# Patient Record
Sex: FEMALE | Race: WHITE | URBAN - NONMETROPOLITAN AREA
[De-identification: names, ages, dates, MRNs, and addresses within clinical notes are randomized per-mention and may not be internally consistent; named-entity substitution may affect disease eponyms.]

---

## 2017-02-28 ENCOUNTER — DOCTOR'S OFFICE (OUTPATIENT)
Dept: URBAN - NONMETROPOLITAN AREA CLINIC 1 | Facility: CLINIC | Age: 52
Setting detail: OPHTHALMOLOGY
End: 2017-02-28
Payer: COMMERCIAL

## 2017-02-28 DIAGNOSIS — Z01.00: ICD-10-CM

## 2017-02-28 DIAGNOSIS — H52.4: ICD-10-CM

## 2017-02-28 PROCEDURE — 92310 CONTACT LENS FITTING OU: CPT | Performed by: OPTOMETRIST

## 2017-02-28 PROCEDURE — 92014 COMPRE OPH EXAM EST PT 1/>: CPT | Performed by: OPTOMETRIST

## 2017-02-28 ASSESSMENT — REFRACTION_AUTOREFRACTION
OS_AXIS: 147
OD_CYLINDER: -0.25
OS_CYLINDER: -0.25
OS_SPHERE: -3.75
OD_AXIS: 035
OD_SPHERE: -2.75

## 2017-02-28 ASSESSMENT — REFRACTION_MANIFEST
OD_VA3: 20/
OD_VA2: 20/
OU_VA: 20/
OD_VA2: 20/
OS_VA1: 20/
OS_VA2: 20/
OU_VA: 20/
OS_VA2: 20/
OS_VA1: 20/
OD_VA1: 20/
OD_VA1: 20/
OS_VA3: 20/
OD_VA3: 20/
OS_VA3: 20/

## 2017-02-28 ASSESSMENT — REFRACTION_OUTSIDERX
OS_ADD: +2.50
OS_VA2: 20/20-2
OS_VA3: 20/
OD_VA2: 20/20-2
OS_SPHERE: -3.50
OD_AXIS: 175
OD_VA1: 20/25
OD_SPHERE: -3.00
OS_VA1: 20/25
OS_CYLINDER: SPH
OD_ADD: +2.50
OD_CYLINDER: -0.25
OD_VA3: 20/
OU_VA: 20/

## 2017-02-28 ASSESSMENT — REFRACTION_CURRENTRX
OD_ADD: +2.25
OD_AXIS: 169
OS_OVR_VA: 20/
OS_OVR_VA: 20/
OD_VPRISM_DIRECTION: PROGS
OD_CYLINDER: -0.25
OS_OVR_VA: 20/
OD_OVR_VA: 20/
OS_SPHERE: -3.25
OD_OVR_VA: 20/
OS_VPRISM_DIRECTION: PROGS
OD_OVR_VA: 20/
OD_SPHERE: -3.25
OS_AXIS: 133
OS_ADD: +2.25
OS_CYLINDER: -0.25

## 2017-02-28 ASSESSMENT — VISUAL ACUITY
OS_BCVA: 20/15-1
OD_BCVA: 20/20-2

## 2017-02-28 ASSESSMENT — TEAR BREAK UP TIME (TBUT)
OS_TBUT: T 5 SEC
OD_TBUT: T 5 SEC

## 2017-02-28 ASSESSMENT — CONFRONTATIONAL VISUAL FIELD TEST (CVF)
OS_FINDINGS: FULL
OD_FINDINGS: FULL

## 2017-02-28 ASSESSMENT — SPHEQUIV_DERIVED
OD_SPHEQUIV: -2.875
OS_SPHEQUIV: -3.875

## 2017-03-29 ENCOUNTER — OPTICAL OFFICE (OUTPATIENT)
Dept: URBAN - NONMETROPOLITAN AREA CLINIC 4 | Facility: CLINIC | Age: 52
Setting detail: OPHTHALMOLOGY
End: 2017-03-29
Payer: MEDICARE

## 2017-03-29 DIAGNOSIS — H52.13: ICD-10-CM

## 2017-03-29 PROCEDURE — S0500 DISPOS CONT LENS: HCPCS | Performed by: OPTOMETRIST

## 2017-11-03 ENCOUNTER — OPTICAL OFFICE (OUTPATIENT)
Dept: URBAN - NONMETROPOLITAN AREA CLINIC 4 | Facility: CLINIC | Age: 52
Setting detail: OPHTHALMOLOGY
End: 2017-11-03

## 2017-11-03 DIAGNOSIS — H52.13: ICD-10-CM

## 2017-11-03 PROCEDURE — S0500 DISPOS CONT LENS: HCPCS | Performed by: OPTOMETRIST

## 2018-02-28 ENCOUNTER — DOCTOR'S OFFICE (OUTPATIENT)
Dept: URBAN - NONMETROPOLITAN AREA CLINIC 1 | Facility: CLINIC | Age: 53
Setting detail: OPHTHALMOLOGY
End: 2018-02-28
Payer: COMMERCIAL

## 2018-02-28 DIAGNOSIS — H52.4: ICD-10-CM

## 2018-02-28 DIAGNOSIS — Z01.00: ICD-10-CM

## 2018-02-28 PROCEDURE — 92310 CONTACT LENS FITTING OU: CPT | Performed by: OPTOMETRIST

## 2018-02-28 PROCEDURE — 92014 COMPRE OPH EXAM EST PT 1/>: CPT | Performed by: OPTOMETRIST

## 2018-02-28 ASSESSMENT — REFRACTION_OUTSIDERX
OS_ADD: +2.50
OD_SPHERE: -3.00
OS_CYLINDER: SPH
OD_AXIS: 175
OS_VA3: 20/
OD_VA3: 20/
OU_VA: 20/
OS_SPHERE: -3.50
OD_ADD: +2.50
OD_VA1: 20/20
OS_VA2: 20/20-2
OD_CYLINDER: -0.25
OD_VA2: 20/20-2
OS_VA1: 20/20

## 2018-02-28 ASSESSMENT — VISUAL ACUITY
OD_BCVA: 20/20
OS_BCVA: 20/15

## 2018-02-28 ASSESSMENT — REFRACTION_MANIFEST
OS_VA2: 20/
OS_VA1: 20/
OD_VA3: 20/
OD_VA2: 20/
OD_VA1: 20/
OD_VA2: 20/
OU_VA: 20/
OS_VA2: 20/
OD_VA3: 20/
OS_VA3: 20/
OD_VA1: 20/
OU_VA: 20/
OS_VA3: 20/
OS_VA1: 20/

## 2018-02-28 ASSESSMENT — CONFRONTATIONAL VISUAL FIELD TEST (CVF)
OD_FINDINGS: FULL
OS_FINDINGS: FULL

## 2018-02-28 ASSESSMENT — REFRACTION_AUTOREFRACTION
OS_AXIS: 145
OS_SPHERE: -3.25
OD_AXIS: 169
OS_CYLINDER: -1.75
OD_CYLINDER: -0.75
OD_SPHERE: -2.75

## 2018-02-28 ASSESSMENT — SPHEQUIV_DERIVED
OD_SPHEQUIV: -3.125
OS_SPHEQUIV: -4.125

## 2018-02-28 ASSESSMENT — REFRACTION_CURRENTRX
OS_VPRISM_DIRECTION: PROGS
OD_VPRISM_DIRECTION: PROGS
OD_ADD: +2.25
OS_OVR_VA: 20/
OD_SPHERE: -3.25
OS_CYLINDER: -0.25
OD_CYLINDER: -0.25
OD_AXIS: 169
OD_OVR_VA: 20/
OS_OVR_VA: 20/
OS_SPHERE: -3.25
OS_OVR_VA: 20/
OS_ADD: +2.25
OS_AXIS: 133
OD_OVR_VA: 20/
OD_OVR_VA: 20/

## 2018-02-28 ASSESSMENT — TEAR BREAK UP TIME (TBUT)
OS_TBUT: T 5 SEC
OD_TBUT: T 5 SEC

## 2018-08-03 ENCOUNTER — OPTICAL OFFICE (OUTPATIENT)
Dept: URBAN - NONMETROPOLITAN AREA CLINIC 4 | Facility: CLINIC | Age: 53
Setting detail: OPHTHALMOLOGY
End: 2018-08-03

## 2018-08-03 DIAGNOSIS — H52.13: ICD-10-CM

## 2018-08-03 PROCEDURE — S0500 DISPOS CONT LENS: HCPCS | Performed by: OPTOMETRIST

## 2019-01-31 ENCOUNTER — OPTICAL OFFICE (OUTPATIENT)
Dept: URBAN - NONMETROPOLITAN AREA CLINIC 4 | Facility: CLINIC | Age: 54
Setting detail: OPHTHALMOLOGY
End: 2019-01-31

## 2019-01-31 DIAGNOSIS — H52.13: ICD-10-CM

## 2019-01-31 PROCEDURE — S0500 DISPOS CONT LENS: HCPCS | Performed by: OPTOMETRIST

## 2019-03-05 ENCOUNTER — DOCTOR'S OFFICE (OUTPATIENT)
Dept: URBAN - NONMETROPOLITAN AREA CLINIC 1 | Facility: CLINIC | Age: 54
Setting detail: OPHTHALMOLOGY
End: 2019-03-05
Payer: COMMERCIAL

## 2019-03-05 ENCOUNTER — OPTICAL OFFICE (OUTPATIENT)
Dept: URBAN - NONMETROPOLITAN AREA CLINIC 4 | Facility: CLINIC | Age: 54
Setting detail: OPHTHALMOLOGY
End: 2019-03-05
Payer: COMMERCIAL

## 2019-03-05 DIAGNOSIS — H52.13: ICD-10-CM

## 2019-03-05 DIAGNOSIS — H52.4: ICD-10-CM

## 2019-03-05 PROCEDURE — 92015 DETERMINE REFRACTIVE STATE: CPT | Performed by: OPTOMETRIST

## 2019-03-05 PROCEDURE — V2203 LENS SPHCYL BIFOCAL 4.00D/.1: HCPCS | Performed by: OPTOMETRIST

## 2019-03-05 PROCEDURE — V2200 LENS SPHER BIFOC PLANO 4.00D: HCPCS | Performed by: OPTOMETRIST

## 2019-03-05 PROCEDURE — V2020 VISION SVCS FRAMES PURCHASES: HCPCS | Performed by: OPTOMETRIST

## 2019-03-05 PROCEDURE — V2025 EYEGLASSES DELUX FRAMES: HCPCS | Performed by: OPTOMETRIST

## 2019-03-05 PROCEDURE — V2781 PROGRESSIVE LENS PER LENS: HCPCS | Performed by: OPTOMETRIST

## 2019-03-05 PROCEDURE — 92310 CONTACT LENS FITTING OU: CPT | Performed by: OPTOMETRIST

## 2019-03-05 ASSESSMENT — TEAR BREAK UP TIME (TBUT)
OS_TBUT: T 5 SEC
OD_TBUT: T 5 SEC

## 2019-03-05 ASSESSMENT — REFRACTION_MANIFEST
OD_VA1: 20/20
OD_VA3: 20/
OD_VA2: 20/
OS_ADD: +2.50
OS_VA3: 20/
OD_SPHERE: -2.75
OD_VA1: 20/
OS_VA1: 20/20
OD_ADD: +2.50
OS_CYLINDER: -0.50
OS_VA2: 20/20-2
OU_VA: 20/
OD_CYLINDER: SPH
OD_VA3: 20/
OS_AXIS: 090
OU_VA: 20/
OS_VA2: 20/
OS_VA3: 20/
OD_VA2: 20/20-2
OS_SPHERE: -3.25
OS_VA1: 20/

## 2019-03-05 ASSESSMENT — SPHEQUIV_DERIVED: OS_SPHEQUIV: -3.5

## 2019-03-05 ASSESSMENT — CONFRONTATIONAL VISUAL FIELD TEST (CVF)
OD_FINDINGS: FULL
OS_FINDINGS: FULL

## 2019-07-19 ENCOUNTER — OPTICAL OFFICE (OUTPATIENT)
Dept: URBAN - NONMETROPOLITAN AREA CLINIC 4 | Facility: CLINIC | Age: 54
Setting detail: OPHTHALMOLOGY
End: 2019-07-19

## 2019-07-19 DIAGNOSIS — H52.13: ICD-10-CM

## 2019-07-19 PROCEDURE — S0500 DISPOS CONT LENS: HCPCS | Performed by: OPTOMETRIST

## 2019-07-19 ASSESSMENT — REFRACTION_CURRENTRX
OD_OVR_VA: 20/
OD_AXIS: 169
OS_VPRISM_DIRECTION: PROGS
OD_OVR_VA: 20/
OS_OVR_VA: 20/
OS_CYLINDER: -0.25
OD_CYLINDER: -0.25
OD_SPHERE: -3.25
OD_VPRISM_DIRECTION: PROGS
OS_ADD: +2.25
OS_SPHERE: -3.25
OS_OVR_VA: 20/
OS_OVR_VA: 20/
OD_ADD: +2.25
OD_OVR_VA: 20/
OS_AXIS: 133

## 2019-07-19 ASSESSMENT — REFRACTION_AUTOREFRACTION
OS_SPHERE: -2.75
OS_AXIS: 091
OD_SPHERE: -2.50
OD_AXIS: 000
OD_CYLINDER: 0.00
OS_CYLINDER: -0.75

## 2019-07-19 ASSESSMENT — VISUAL ACUITY
OS_BCVA: 20/25+2
OD_BCVA: 20/20

## 2019-07-19 ASSESSMENT — SPHEQUIV_DERIVED
OS_SPHEQUIV: -3.125
OD_SPHEQUIV: -2.5

## 2020-03-01 ENCOUNTER — HOSPITAL ENCOUNTER (EMERGENCY)
Facility: HOSPITAL | Age: 55
Discharge: HOME | End: 2020-03-01
Attending: EMERGENCY MEDICINE | Admitting: EMERGENCY MEDICINE
Payer: COMMERCIAL

## 2020-03-01 ENCOUNTER — APPOINTMENT (EMERGENCY)
Dept: RADIOLOGY | Facility: HOSPITAL | Age: 55
End: 2020-03-01
Payer: COMMERCIAL

## 2020-03-01 ENCOUNTER — APPOINTMENT (EMERGENCY)
Dept: CT IMAGING | Facility: HOSPITAL | Age: 55
End: 2020-03-01
Payer: COMMERCIAL

## 2020-03-01 VITALS
OXYGEN SATURATION: 98 % | HEIGHT: 65 IN | SYSTOLIC BLOOD PRESSURE: 168 MMHG | RESPIRATION RATE: 18 BRPM | WEIGHT: 174 LBS | DIASTOLIC BLOOD PRESSURE: 82 MMHG | TEMPERATURE: 98 F | HEART RATE: 107 BPM | BODY MASS INDEX: 28.99 KG/M2

## 2020-03-01 DIAGNOSIS — R51.9 HEADACHE: Primary | ICD-10-CM

## 2020-03-01 DIAGNOSIS — S16.1XXA NECK STRAIN, INITIAL ENCOUNTER: ICD-10-CM

## 2020-03-01 PROCEDURE — 99284 EMERGENCY DEPT VISIT MOD MDM: CPT

## 2020-03-01 PROCEDURE — 99284 EMERGENCY DEPT VISIT MOD MDM: CPT | Performed by: EMERGENCY MEDICINE

## 2020-03-01 PROCEDURE — 72040 X-RAY EXAM NECK SPINE 2-3 VW: CPT

## 2020-03-01 PROCEDURE — 70450 CT HEAD/BRAIN W/O DYE: CPT

## 2020-03-01 PROCEDURE — 71046 X-RAY EXAM CHEST 2 VIEWS: CPT

## 2020-03-01 RX ORDER — AMOXICILLIN AND CLAVULANATE POTASSIUM 875; 125 MG/1; MG/1
TABLET, FILM COATED ORAL
COMMUNITY
Start: 2020-02-27

## 2020-03-01 RX ORDER — LISINOPRIL 10 MG/1
TABLET ORAL
COMMUNITY
Start: 2015-03-17

## 2020-03-01 NOTE — DISCHARGE INSTRUCTIONS
Return immediately if worse or any new symptoms  Tylenol 1000 mg every 6 hours as needed  and/or  Advil 400 mg every 6 hours as needed  May take both together  Ice 20 minutes hourly as needed  Continue antibiotic/Augmentin as currently taking
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
## 2020-03-06 ENCOUNTER — DOCTOR'S OFFICE (OUTPATIENT)
Dept: URBAN - NONMETROPOLITAN AREA CLINIC 1 | Facility: CLINIC | Age: 55
Setting detail: OPHTHALMOLOGY
End: 2020-03-06
Payer: COMMERCIAL

## 2020-03-06 DIAGNOSIS — H52.4: ICD-10-CM

## 2020-03-06 PROCEDURE — 92014 COMPRE OPH EXAM EST PT 1/>: CPT | Performed by: OPTOMETRIST

## 2020-03-06 PROCEDURE — 92310 CONTACT LENS FITTING OU: CPT | Performed by: OPTOMETRIST

## 2020-03-06 ASSESSMENT — CONFRONTATIONAL VISUAL FIELD TEST (CVF)
OS_FINDINGS: FULL
OD_FINDINGS: FULL

## 2020-03-06 ASSESSMENT — TEAR BREAK UP TIME (TBUT)
OS_TBUT: T 5 SEC
OD_TBUT: T 5 SEC

## 2020-06-05 ENCOUNTER — OPTICAL OFFICE (OUTPATIENT)
Dept: URBAN - NONMETROPOLITAN AREA CLINIC 4 | Facility: CLINIC | Age: 55
Setting detail: OPHTHALMOLOGY
End: 2020-06-05

## 2020-06-05 DIAGNOSIS — H52.13: ICD-10-CM

## 2020-06-05 PROCEDURE — S0500 DISPOS CONT LENS: HCPCS | Performed by: OPTOMETRIST

## 2020-06-05 ASSESSMENT — SPHEQUIV_DERIVED
OS_SPHEQUIV: -3.5
OS_SPHEQUIV: -3.5
OD_SPHEQUIV: -2.75

## 2020-06-05 ASSESSMENT — REFRACTION_MANIFEST
OS_ADD: +2.50
OS_AXIS: 090
OD_SPHERE: -2.75
OS_SPHERE: -3.25
OS_CYLINDER: -0.50
OS_VA2: 20/20-2
OD_VA1: 20/20
OS_VA1: 20/20
OD_CYLINDER: SPH
OD_VA2: 20/20-2
OD_ADD: +2.50

## 2020-06-05 ASSESSMENT — REFRACTION_AUTOREFRACTION
OS_CYLINDER: -0.50
OD_AXIS: 000
OS_SPHERE: -3.25
OD_CYLINDER: 0.00
OS_AXIS: 077
OD_SPHERE: -2.75

## 2020-06-05 ASSESSMENT — VISUAL ACUITY
OD_BCVA: 20/25+2
OS_BCVA: 20/20-2

## 2020-06-05 ASSESSMENT — REFRACTION_CURRENTRX
OS_ADD: +2.25
OS_SPHERE: -3.50
OD_ADD: +2.25
OD_CYLINDER: -0.50
OD_SPHERE: -3.25
OD_AXIS: 025
OS_VPRISM_DIRECTION: PROGS
OS_OVR_VA: 20/
OD_OVR_VA: 20/
OD_VPRISM_DIRECTION: PROGS

## 2020-10-26 ENCOUNTER — HOSPITAL ENCOUNTER (EMERGENCY)
Facility: HOSPITAL | Age: 55
Discharge: HOME | End: 2020-10-26
Attending: EMERGENCY MEDICINE | Admitting: EMERGENCY MEDICINE
Payer: COMMERCIAL

## 2020-10-26 ENCOUNTER — APPOINTMENT (EMERGENCY)
Dept: RADIOLOGY | Facility: HOSPITAL | Age: 55
End: 2020-10-26
Payer: COMMERCIAL

## 2020-10-26 VITALS
WEIGHT: 175 LBS | HEIGHT: 65 IN | OXYGEN SATURATION: 97 % | BODY MASS INDEX: 29.16 KG/M2 | TEMPERATURE: 98.7 F | DIASTOLIC BLOOD PRESSURE: 82 MMHG | SYSTOLIC BLOOD PRESSURE: 141 MMHG | RESPIRATION RATE: 20 BRPM | HEART RATE: 91 BPM

## 2020-10-26 DIAGNOSIS — J12.82 PNEUMONIA DUE TO COVID-19 VIRUS: Primary | ICD-10-CM

## 2020-10-26 DIAGNOSIS — U07.1 PNEUMONIA DUE TO COVID-19 VIRUS: Primary | ICD-10-CM

## 2020-10-26 LAB
ALBUMIN SERPL BCP-MCNC: 3.6 G/DL (ref 3.5–5)
ALP SERPL-CCNC: 49 U/L (ref 46–116)
ALT SERPL W P-5'-P-CCNC: 13 U/L (ref 12–78)
ANION GAP SERPL CALCULATED.3IONS-SCNC: 7 MMOL/L (ref 4–13)
AST SERPL W P-5'-P-CCNC: 20 U/L (ref 5–45)
BASOPHILS # BLD AUTO: 0.04 THOUSANDS/ΜL (ref 0–0.1)
BASOPHILS NFR BLD AUTO: 1 % (ref 0–1)
BILIRUB SERPL-MCNC: 0.4 MG/DL (ref 0.2–1)
BUN SERPL-MCNC: 13 MG/DL (ref 5–25)
CALCIUM SERPL-MCNC: 8.5 MG/DL (ref 8.3–10.1)
CHLORIDE SERPL-SCNC: 101 MMOL/L (ref 100–108)
CO2 SERPL-SCNC: 28 MMOL/L (ref 21–32)
CREAT SERPL-MCNC: 0.84 MG/DL (ref 0.6–1.3)
EOSINOPHIL # BLD AUTO: 0.03 THOUSAND/ΜL (ref 0–0.61)
EOSINOPHIL NFR BLD AUTO: 1 % (ref 0–6)
ERYTHROCYTE [DISTWIDTH] IN BLOOD BY AUTOMATED COUNT: 12.9 % (ref 11.6–15.1)
GFR SERPL CREATININE-BSD FRML MDRD: 77 ML/MIN/1.73SQ M
GLUCOSE SERPL-MCNC: 96 MG/DL (ref 65–140)
HCT VFR BLD AUTO: 37.2 % (ref 34.8–46.1)
HGB BLD-MCNC: 12 G/DL (ref 11.5–15.4)
IMM GRANULOCYTES # BLD AUTO: 0.04 THOUSAND/UL (ref 0–0.2)
IMM GRANULOCYTES NFR BLD AUTO: 1 % (ref 0–2)
LACTATE SERPL-SCNC: 0.6 MMOL/L (ref 0.5–2)
LYMPHOCYTES # BLD AUTO: 1.27 THOUSANDS/ΜL (ref 0.6–4.47)
LYMPHOCYTES NFR BLD AUTO: 24 % (ref 14–44)
MCH RBC QN AUTO: 29.1 PG (ref 26.8–34.3)
MCHC RBC AUTO-ENTMCNC: 32.3 G/DL (ref 31.4–37.4)
MCV RBC AUTO: 90 FL (ref 82–98)
MONOCYTES # BLD AUTO: 0.33 THOUSAND/ΜL (ref 0.17–1.22)
MONOCYTES NFR BLD AUTO: 6 % (ref 4–12)
NEUTROPHILS # BLD AUTO: 3.64 THOUSANDS/ΜL (ref 1.85–7.62)
NEUTS SEG NFR BLD AUTO: 67 % (ref 43–75)
NRBC BLD AUTO-RTO: 0 /100 WBCS
PLATELET # BLD AUTO: 229 THOUSANDS/UL (ref 149–390)
PMV BLD AUTO: 9.8 FL (ref 8.9–12.7)
POTASSIUM SERPL-SCNC: 4.3 MMOL/L (ref 3.5–5.3)
PROT SERPL-MCNC: 7.7 G/DL (ref 6.4–8.2)
RBC # BLD AUTO: 4.12 MILLION/UL (ref 3.81–5.12)
SODIUM SERPL-SCNC: 136 MMOL/L (ref 136–145)
TROPONIN I SERPL-MCNC: <0.02 NG/ML
WBC # BLD AUTO: 5.35 THOUSAND/UL (ref 4.31–10.16)

## 2020-10-26 PROCEDURE — 80053 COMPREHEN METABOLIC PANEL: CPT | Performed by: PHYSICIAN ASSISTANT

## 2020-10-26 PROCEDURE — 96365 THER/PROPH/DIAG IV INF INIT: CPT

## 2020-10-26 PROCEDURE — 84484 ASSAY OF TROPONIN QUANT: CPT | Performed by: PHYSICIAN ASSISTANT

## 2020-10-26 PROCEDURE — 99285 EMERGENCY DEPT VISIT HI MDM: CPT

## 2020-10-26 PROCEDURE — 71045 X-RAY EXAM CHEST 1 VIEW: CPT

## 2020-10-26 PROCEDURE — 36415 COLL VENOUS BLD VENIPUNCTURE: CPT | Performed by: PHYSICIAN ASSISTANT

## 2020-10-26 PROCEDURE — 83605 ASSAY OF LACTIC ACID: CPT | Performed by: PHYSICIAN ASSISTANT

## 2020-10-26 PROCEDURE — 99285 EMERGENCY DEPT VISIT HI MDM: CPT | Performed by: EMERGENCY MEDICINE

## 2020-10-26 PROCEDURE — 96361 HYDRATE IV INFUSION ADD-ON: CPT

## 2020-10-26 PROCEDURE — 93005 ELECTROCARDIOGRAM TRACING: CPT

## 2020-10-26 PROCEDURE — 85025 COMPLETE CBC W/AUTO DIFF WBC: CPT | Performed by: PHYSICIAN ASSISTANT

## 2020-10-26 RX ORDER — AZITHROMYCIN 250 MG/1
TABLET, FILM COATED ORAL
Qty: 6 TABLET | Refills: 0 | Status: SHIPPED | OUTPATIENT
Start: 2020-10-26 | End: 2020-10-30

## 2020-10-26 RX ORDER — ALBUTEROL SULFATE 90 UG/1
2 AEROSOL, METERED RESPIRATORY (INHALATION) EVERY 4 HOURS PRN
Qty: 1 INHALER | Refills: 0 | Status: SHIPPED | OUTPATIENT
Start: 2020-10-26

## 2020-10-26 RX ORDER — CEFTRIAXONE 2 G/50ML
2000 INJECTION, SOLUTION INTRAVENOUS ONCE
Status: COMPLETED | OUTPATIENT
Start: 2020-10-26 | End: 2020-10-26

## 2020-10-26 RX ADMIN — SODIUM CHLORIDE 1000 ML: 0.9 INJECTION, SOLUTION INTRAVENOUS at 17:20

## 2020-10-26 RX ADMIN — CEFTRIAXONE 2000 MG: 2 INJECTION, SOLUTION INTRAVENOUS at 18:21

## 2020-10-29 LAB
ATRIAL RATE: 92 BPM
P AXIS: 42 DEGREES
PR INTERVAL: 164 MS
QRS AXIS: 12 DEGREES
QRSD INTERVAL: 96 MS
QT INTERVAL: 350 MS
QTC INTERVAL: 432 MS
T WAVE AXIS: 32 DEGREES
VENTRICULAR RATE: 92 BPM

## 2020-10-29 PROCEDURE — 93010 ELECTROCARDIOGRAM REPORT: CPT | Performed by: INTERNAL MEDICINE

## 2021-03-12 ENCOUNTER — DOCTOR'S OFFICE (OUTPATIENT)
Dept: URBAN - NONMETROPOLITAN AREA CLINIC 1 | Facility: CLINIC | Age: 56
Setting detail: OPHTHALMOLOGY
End: 2021-03-12

## 2021-03-12 ENCOUNTER — OPTICAL OFFICE (OUTPATIENT)
Dept: URBAN - NONMETROPOLITAN AREA CLINIC 4 | Facility: CLINIC | Age: 56
Setting detail: OPHTHALMOLOGY
End: 2021-03-12

## 2021-03-12 VITALS — HEIGHT: 55 IN

## 2021-03-12 DIAGNOSIS — H52.13: ICD-10-CM

## 2021-03-12 DIAGNOSIS — H25.013: ICD-10-CM

## 2021-03-12 DIAGNOSIS — H52.4: ICD-10-CM

## 2021-03-12 DIAGNOSIS — H04.123: ICD-10-CM

## 2021-03-12 PROBLEM — Z01.00: Status: RESOLVED | Noted: 2017-02-28 | Resolved: 2021-03-12

## 2021-03-12 PROCEDURE — S0500 DISPOS CONT LENS: HCPCS | Performed by: OPTOMETRIST

## 2021-03-12 PROCEDURE — SELFPAYVIS VISION VISIT SELF PAY: Performed by: OPTOMETRIST

## 2021-03-12 ASSESSMENT — REFRACTION_MANIFEST
OS_SPHERE: -3.25
OS_VA2: 20/20-2
OS_CYLINDER: -0.50
OD_CYLINDER: SPH
OD_ADD: +2.50
OD_SPHERE: -2.75
OD_VA2: 20/20-2
OS_AXIS: 090
OS_ADD: +2.50
OD_VA1: 20/20
OS_VA1: 20/20

## 2021-03-12 ASSESSMENT — REFRACTION_CURRENTRX
OS_OVR_VA: 20/
OD_SPHERE: -2.75
OS_VPRISM_DIRECTION: PROGS
OS_ADD: +2.25
OS_SPHERE: -3.25
OS_AXIS: 092
OD_ADD: +2.25
OS_CYLINDER: -0.50
OD_VPRISM_DIRECTION: PROGS
OD_AXIS: 013
OD_OVR_VA: 20/
OD_CYLINDER: -0.25

## 2021-03-12 ASSESSMENT — REFRACTION_AUTOREFRACTION
OS_AXIS: 087
OD_AXIS: 180
OS_SPHERE: -3.00
OD_SPHERE: -2.75
OS_CYLINDER: -0.75
OD_CYLINDER: 0.00

## 2021-03-12 ASSESSMENT — SPHEQUIV_DERIVED
OS_SPHEQUIV: -3.5
OS_SPHEQUIV: -3.375
OD_SPHEQUIV: -2.75

## 2021-03-12 ASSESSMENT — TONOMETRY
OD_IOP_MMHG: 15
OS_IOP_MMHG: 15

## 2021-03-12 ASSESSMENT — VISUAL ACUITY
OD_BCVA: 20/20
OS_BCVA: 20/20

## 2021-03-12 ASSESSMENT — SUPERFICIAL PUNCTATE KERATITIS (SPK)
OD_SPK: T
OS_SPK: T

## 2021-03-12 ASSESSMENT — CONFRONTATIONAL VISUAL FIELD TEST (CVF)
OS_FINDINGS: FULL
OD_FINDINGS: FULL

## 2021-07-28 ENCOUNTER — OPTICAL OFFICE (OUTPATIENT)
Dept: URBAN - NONMETROPOLITAN AREA CLINIC 4 | Facility: CLINIC | Age: 56
Setting detail: OPHTHALMOLOGY
End: 2021-07-28

## 2021-07-28 DIAGNOSIS — H52.13: ICD-10-CM

## 2021-07-28 PROCEDURE — S0500 DISPOS CONT LENS: HCPCS | Performed by: OPTOMETRIST

## 2022-03-18 ENCOUNTER — RX ONLY (RX ONLY)
Age: 57
End: 2022-03-18

## 2022-03-18 ENCOUNTER — OPTICAL OFFICE (OUTPATIENT)
Dept: URBAN - NONMETROPOLITAN AREA CLINIC 4 | Facility: CLINIC | Age: 57
Setting detail: OPHTHALMOLOGY
End: 2022-03-18
Payer: COMMERCIAL

## 2022-03-18 ENCOUNTER — OPTICAL OFFICE (OUTPATIENT)
Dept: URBAN - NONMETROPOLITAN AREA CLINIC 4 | Facility: CLINIC | Age: 57
Setting detail: OPHTHALMOLOGY
End: 2022-03-18

## 2022-03-18 ENCOUNTER — DOCTOR'S OFFICE (OUTPATIENT)
Dept: URBAN - NONMETROPOLITAN AREA CLINIC 1 | Facility: CLINIC | Age: 57
Setting detail: OPHTHALMOLOGY
End: 2022-03-18
Payer: COMMERCIAL

## 2022-03-18 DIAGNOSIS — H52.13: ICD-10-CM

## 2022-03-18 DIAGNOSIS — H52.4: ICD-10-CM

## 2022-03-18 DIAGNOSIS — H52.7: ICD-10-CM

## 2022-03-18 PROBLEM — H04.123 DRY EYE; BOTH EYES: Status: ACTIVE | Noted: 2021-03-12

## 2022-03-18 PROBLEM — H25.013 CORTICAL CATARACT; BOTH EYES: Status: ACTIVE | Noted: 2021-03-12

## 2022-03-18 PROCEDURE — V2020 VISION SVCS FRAMES PURCHASES: HCPCS | Performed by: OPTOMETRIST

## 2022-03-18 PROCEDURE — V2025 EYEGLASSES DELUX FRAMES: HCPCS | Performed by: OPTOMETRIST

## 2022-03-18 PROCEDURE — 92014 COMPRE OPH EXAM EST PT 1/>: CPT | Performed by: OPTOMETRIST

## 2022-03-18 PROCEDURE — V2799T TAXABLE VISION SERVICE MISCELLANEOUS: Performed by: OPTOMETRIST

## 2022-03-18 PROCEDURE — V2200 LENS SPHER BIFOC PLANO 4.00D: HCPCS | Performed by: OPTOMETRIST

## 2022-03-18 PROCEDURE — V2203 LENS SPHCYL BIFOCAL 4.00D/.1: HCPCS | Performed by: OPTOMETRIST

## 2022-03-18 PROCEDURE — 92310 CONTACT LENS FITTING OU: CPT | Performed by: OPTOMETRIST

## 2022-03-18 PROCEDURE — V2781 PROGRESSIVE LENS PER LENS: HCPCS | Performed by: OPTOMETRIST

## 2022-03-18 ASSESSMENT — REFRACTION_AUTOREFRACTION
OD_CYLINDER: 0.00
OS_SPHERE: -3.25
OD_SPHERE: -2.50
OS_AXIS: 91
OS_CYLINDER: -0.25

## 2022-03-18 ASSESSMENT — REFRACTION_CURRENTRX
OD_ADD: +2.25
OD_OVR_VA: 20/
OS_CYLINDER: -1.25
OD_AXIS: 180
OS_ADD: +2.25
OS_OVR_VA: 20/
OD_SPHERE: -2.75
OD_CYLINDER: 0.00
OS_AXIS: 98
OS_SPHERE: -0.50
OD_VPRISM_DIRECTION: PROGS
OS_VPRISM_DIRECTION: PROGS

## 2022-03-18 ASSESSMENT — REFRACTION_MANIFEST
OS_VA2: 20/20-2
OS_CYLINDER: -0.50
OS_AXIS: 090
OS_SPHERE: -3.25
OD_CYLINDER: SPH
OS_VA1: 20/20
OD_VA2: 20/20-2
OS_ADD: +2.50
OD_VA1: 20/20
OD_SPHERE: -2.75
OD_ADD: +2.50

## 2022-03-18 ASSESSMENT — SUPERFICIAL PUNCTATE KERATITIS (SPK)
OS_SPK: T
OD_SPK: T

## 2022-03-18 ASSESSMENT — VISUAL ACUITY
OS_BCVA: 20/20-1
OD_BCVA: 20/20

## 2022-03-18 ASSESSMENT — SPHEQUIV_DERIVED
OD_SPHEQUIV: -2.5
OS_SPHEQUIV: -3.375
OS_SPHEQUIV: -3.5

## 2022-03-18 ASSESSMENT — CONFRONTATIONAL VISUAL FIELD TEST (CVF)
OD_FINDINGS: FULL
OS_FINDINGS: FULL

## 2022-03-18 ASSESSMENT — TONOMETRY
OS_IOP_MMHG: 15
OD_IOP_MMHG: 15

## 2023-03-24 ENCOUNTER — OPTICAL OFFICE (OUTPATIENT)
Dept: URBAN - NONMETROPOLITAN AREA CLINIC 4 | Facility: CLINIC | Age: 58
Setting detail: OPHTHALMOLOGY
End: 2023-03-24

## 2023-03-24 ENCOUNTER — DOCTOR'S OFFICE (OUTPATIENT)
Dept: URBAN - NONMETROPOLITAN AREA CLINIC 1 | Facility: CLINIC | Age: 58
Setting detail: OPHTHALMOLOGY
End: 2023-03-24
Payer: COMMERCIAL

## 2023-03-24 DIAGNOSIS — H52.4: ICD-10-CM

## 2023-03-24 DIAGNOSIS — H04.123: ICD-10-CM

## 2023-03-24 DIAGNOSIS — H25.013: ICD-10-CM

## 2023-03-24 PROCEDURE — 92015 DETERMINE REFRACTIVE STATE: CPT | Performed by: OPTOMETRIST

## 2023-03-24 PROCEDURE — 92014 COMPRE OPH EXAM EST PT 1/>: CPT | Performed by: OPTOMETRIST

## 2023-03-24 PROCEDURE — S0500 DISPOS CONT LENS: HCPCS | Performed by: OPTOMETRIST

## 2023-03-24 ASSESSMENT — REFRACTION_MANIFEST
OS_ADD: +2.50
OD_SPHERE: -2.75
OS_CYLINDER: -0.50
OS_AXIS: 090
OS_SPHERE: -3.25
OD_VA2: 20/20-2
OS_VA2: 20/20-2
OD_VA1: 20/20
OS_VA1: 20/20
OD_CYLINDER: SPH
OD_ADD: +2.50

## 2023-03-24 ASSESSMENT — REFRACTION_CURRENTRX
OD_AXIS: 180
OS_VPRISM_DIRECTION: PROGS
OS_SPHERE: -3.25
OS_CYLINDER: -0.50
OD_SPHERE: -2.75
OS_AXIS: 092
OD_VPRISM_DIRECTION: PROGS
OS_ADD: +2.25
OD_OVR_VA: 20/
OS_OVR_VA: 20/
OD_ADD: +1.75
OD_CYLINDER: 0.00

## 2023-03-24 ASSESSMENT — REFRACTION_AUTOREFRACTION
OS_AXIS: 122
OD_CYLINDER: -0.25
OS_CYLINDER: -0.50
OD_SPHERE: -2.25
OD_AXIS: 043
OS_SPHERE: -3.25

## 2023-03-24 ASSESSMENT — SPHEQUIV_DERIVED
OS_SPHEQUIV: -3.5
OD_SPHEQUIV: -2.375
OS_SPHEQUIV: -3.5

## 2023-03-24 ASSESSMENT — SUPERFICIAL PUNCTATE KERATITIS (SPK)
OD_SPK: T
OS_SPK: T

## 2023-03-24 ASSESSMENT — VISUAL ACUITY
OD_BCVA: 20/20
OS_BCVA: 20/20

## 2023-03-24 ASSESSMENT — TONOMETRY
OS_IOP_MMHG: 17
OD_IOP_MMHG: 17

## 2023-03-24 ASSESSMENT — CONFRONTATIONAL VISUAL FIELD TEST (CVF)
OD_FINDINGS: FULL
OS_FINDINGS: FULL

## 2024-03-29 ENCOUNTER — OPTICAL OFFICE (OUTPATIENT)
Dept: URBAN - NONMETROPOLITAN AREA CLINIC 4 | Facility: CLINIC | Age: 59
Setting detail: OPHTHALMOLOGY
End: 2024-03-29

## 2024-03-29 ENCOUNTER — OPTICAL OFFICE (OUTPATIENT)
Dept: URBAN - NONMETROPOLITAN AREA CLINIC 4 | Facility: CLINIC | Age: 59
Setting detail: OPHTHALMOLOGY
End: 2024-03-29
Payer: COMMERCIAL

## 2024-03-29 ENCOUNTER — DOCTOR'S OFFICE (OUTPATIENT)
Dept: URBAN - NONMETROPOLITAN AREA CLINIC 1 | Facility: CLINIC | Age: 59
Setting detail: OPHTHALMOLOGY
End: 2024-03-29
Payer: COMMERCIAL

## 2024-03-29 DIAGNOSIS — H52.4: ICD-10-CM

## 2024-03-29 PROCEDURE — 92310 CONTACT LENS FITTING OU: CPT | Performed by: OPTOMETRIST

## 2024-03-29 PROCEDURE — V2781 PROGRESSIVE LENS PER LENS: HCPCS | Mod: T4 | Performed by: OPTOMETRIST

## 2024-03-29 PROCEDURE — V2025 EYEGLASSES DELUX FRAMES: HCPCS | Performed by: OPTOMETRIST

## 2024-03-29 PROCEDURE — V2203 LENS SPHCYL BIFOCAL 4.00D/.1: HCPCS | Performed by: OPTOMETRIST

## 2024-03-29 PROCEDURE — V2781 PROGRESSIVE LENS PER LENS: HCPCS | Mod: LT | Performed by: OPTOMETRIST

## 2024-03-29 PROCEDURE — S0500 DISPOS CONT LENS: HCPCS | Mod: RT | Performed by: OPTOMETRIST

## 2024-03-29 PROCEDURE — 92014 COMPRE OPH EXAM EST PT 1/>: CPT | Performed by: OPTOMETRIST

## 2024-03-29 PROCEDURE — V2020 VISION SVCS FRAMES PURCHASES: HCPCS | Performed by: OPTOMETRIST

## 2024-04-01 ASSESSMENT — REFRACTION_CURRENTRX
OD_CYLINDER: -0.25
OS_CYLINDER: -0.50
OS_SPHERE: -2.75
OD_OVR_VA: 20/
OS_ADD: +2.25
OD_VPRISM_DIRECTION: SV
OS_AXIS: 111
OD_SPHERE: -2.25
OD_AXIS: 033
OS_OVR_VA: 20/
OD_ADD: +1.75
OS_VPRISM_DIRECTION: SV

## 2024-04-01 ASSESSMENT — REFRACTION_MANIFEST
OS_ADD: +2.50
OD_VA2: 20/20-2
OS_VA2: 20/20-2
OD_SPHERE: -2.25
OD_CYLINDER: -0.25
OD_ADD: +2.50
OS_AXIS: 090
OS_CYLINDER: -0.50
OS_SPHERE: -3.25
OD_VA1: 20/20-2
OS_VA1: 20/20
OD_AXIS: 065

## 2024-04-01 ASSESSMENT — KERATOMETRY
OD_K1POWER_DIOPTERS: 44.00
OD_K2POWER_DIOPTERS: 44.50
OS_K1POWER_DIOPTERS: 44.25
OS_K2POWER_DIOPTERS: 44.25
OD_AXISANGLE_DEGREES: 097
OS_AXISANGLE_DEGREES: 090

## 2024-04-01 ASSESSMENT — AXIALLENGTH_DERIVED
OD_AL: 24.2601
OS_AL: 24.7327

## 2024-04-01 ASSESSMENT — SPHEQUIV_DERIVED
OS_SPHEQUIV: -3.5
OD_SPHEQUIV: -2.375

## 2024-04-26 ENCOUNTER — OPTICAL OFFICE (OUTPATIENT)
Dept: URBAN - NONMETROPOLITAN AREA CLINIC 4 | Facility: CLINIC | Age: 59
Setting detail: OPHTHALMOLOGY
End: 2024-04-26

## 2024-04-26 DIAGNOSIS — H52.4: ICD-10-CM

## 2024-04-26 PROCEDURE — V2799T TAXABLE VISION SERVICE MISCELLANEOUS: Performed by: OPTOMETRIST

## 2025-03-07 ENCOUNTER — OPTICAL OFFICE (OUTPATIENT)
Dept: URBAN - NONMETROPOLITAN AREA CLINIC 4 | Facility: CLINIC | Age: 60
Setting detail: OPHTHALMOLOGY
End: 2025-03-07

## 2025-03-07 ENCOUNTER — DOCTOR'S OFFICE (OUTPATIENT)
Dept: URBAN - NONMETROPOLITAN AREA CLINIC 1 | Facility: CLINIC | Age: 60
Setting detail: OPHTHALMOLOGY
End: 2025-03-07

## 2025-03-07 DIAGNOSIS — H52.4: ICD-10-CM

## 2025-03-07 PROBLEM — H00.14 CHALAZION; LEFT UPPER LID: Status: ACTIVE | Noted: 2025-03-07

## 2025-03-07 PROCEDURE — SELFPAYVIS VISION VISIT SELF PAY: Performed by: OPTOMETRIST

## 2025-03-07 PROCEDURE — V2781 PROGRESSIVE LENS PER LENS: HCPCS | Mod: LT | Performed by: OPTOMETRIST

## 2025-03-07 PROCEDURE — V2781 PROGRESSIVE LENS PER LENS: HCPCS | Performed by: OPTOMETRIST

## 2025-03-07 PROCEDURE — V2784 LENS POLYCARB OR EQUAL: HCPCS | Mod: LT | Performed by: OPTOMETRIST

## 2025-03-07 PROCEDURE — V2020 VISION SVCS FRAMES PURCHASES: HCPCS | Performed by: OPTOMETRIST

## 2025-03-07 PROCEDURE — V2784 LENS POLYCARB OR EQUAL: HCPCS | Performed by: OPTOMETRIST

## 2025-03-07 ASSESSMENT — KERATOMETRY
OS_K2POWER_DIOPTERS: 44.25
OD_K1POWER_DIOPTERS: 44.00
OD_K2POWER_DIOPTERS: 44.50
OD_AXISANGLE_DEGREES: 097
OS_AXISANGLE_DEGREES: 090
OS_K1POWER_DIOPTERS: 44.25

## 2025-03-07 ASSESSMENT — REFRACTION_CURRENTRX
OD_OVR_VA: 20/
OD_CYLINDER: -0.25
OD_SPHERE: -2.25
OD_VPRISM_DIRECTION: PROGS
OS_ADD: +2.25
OD_ADD: +1.75
OS_AXIS: 111
OS_OVR_VA: 20/
OS_VPRISM_DIRECTION: PROGS
OS_CYLINDER: -0.50
OS_SPHERE: -2.75
OD_AXIS: 033

## 2025-03-07 ASSESSMENT — REFRACTION_MANIFEST
OD_CYLINDER: -0.50
OS_AXIS: 090
OS_AXIS: 090
OD_VA1: 20/25+2
OD_AXIS: 120
OS_SPHERE: -2.75
OD_SPHERE: -2.00
OD_ADD: +1.25
OS_ADD: +1.25
OS_VA1: 20/20-2
OD_VA1: 20/25+2
OS_VA2: 20/20-2
OS_SPHERE: -1.50
OD_VA2: 20/25+2
OS_CYLINDER: -1.00
OD_VA2: 20/25+2
OS_ADD: +2.50
OS_CYLINDER: -1.00
OS_VA2: 20/20-2
OD_CYLINDER: -0.50
OS_VA1: 20/20-2
OD_ADD: +2.50
OD_SPHERE: -0.75
OD_AXIS: 120

## 2025-03-07 ASSESSMENT — REFRACTION_AUTOREFRACTION
OD_SPHERE: -1.75
OS_AXIS: 097
OD_CYLINDER: -0.75
OS_SPHERE: -2.00
OD_AXIS: 120
OS_CYLINDER: -1.25

## 2025-03-07 ASSESSMENT — VISUAL ACUITY
OD_BCVA: 20/25-2
OS_BCVA: 20/30-2

## 2025-03-07 ASSESSMENT — TONOMETRY
OS_IOP_MMHG: 16
OD_IOP_MMHG: 16

## 2025-03-07 ASSESSMENT — CONFRONTATIONAL VISUAL FIELD TEST (CVF)
OD_FINDINGS: FULL
OS_FINDINGS: FULL

## 2025-03-07 ASSESSMENT — SUPERFICIAL PUNCTATE KERATITIS (SPK)
OS_SPK: T
OD_SPK: T

## 2025-06-12 ENCOUNTER — OPTICAL OFFICE (OUTPATIENT)
Dept: URBAN - NONMETROPOLITAN AREA CLINIC 4 | Facility: CLINIC | Age: 60
Setting detail: OPHTHALMOLOGY
End: 2025-06-12

## 2025-06-12 DIAGNOSIS — H52.4: ICD-10-CM

## 2025-06-12 PROCEDURE — V2781 PROGRESSIVE LENS PER LENS: HCPCS | Mod: LT | Performed by: OPTOMETRIST

## 2025-06-12 PROCEDURE — V2784 LENS POLYCARB OR EQUAL: HCPCS | Mod: LT | Performed by: OPTOMETRIST

## 2025-06-12 PROCEDURE — V2784 LENS POLYCARB OR EQUAL: HCPCS | Performed by: OPTOMETRIST

## 2025-06-12 PROCEDURE — V2020 VISION SVCS FRAMES PURCHASES: HCPCS | Performed by: OPTOMETRIST

## 2025-06-12 PROCEDURE — V2781 PROGRESSIVE LENS PER LENS: HCPCS | Performed by: OPTOMETRIST

## 2025-06-26 ENCOUNTER — OPTICAL OFFICE (OUTPATIENT)
Dept: URBAN - NONMETROPOLITAN AREA CLINIC 4 | Facility: CLINIC | Age: 60
Setting detail: OPHTHALMOLOGY
End: 2025-06-26

## 2025-06-26 DIAGNOSIS — H52.4: ICD-10-CM

## 2025-06-26 PROCEDURE — S0500 DISPOS CONT LENS: HCPCS | Mod: RT | Performed by: OPTOMETRIST

## 2025-06-26 PROCEDURE — S0500 DISPOS CONT LENS: HCPCS | Mod: LT | Performed by: OPTOMETRIST

## 2025-06-26 ASSESSMENT — REFRACTION_MANIFEST
OD_ADD: +1.25
OD_ADD: +2.50
OD_VA2: 20/25+2
OS_AXIS: 090
OS_ADD: +2.50
OS_VA1: 20/20-2
OS_SPHERE: -2.75
OD_VA2: 20/25+2
OD_AXIS: 120
OS_AXIS: 090
OS_VA2: 20/20-2
OD_SPHERE: -0.75
OS_VA2: 20/20-2
OS_CYLINDER: -1.00
OS_SPHERE: -1.50
OD_SPHERE: -2.00
OD_AXIS: 120
OD_CYLINDER: -0.50
OS_ADD: +1.25
OS_CYLINDER: -1.00
OD_VA1: 20/25+2
OD_VA1: 20/25+2
OD_CYLINDER: -0.50
OS_VA1: 20/20-2

## 2025-06-26 ASSESSMENT — KERATOMETRY
OS_K1POWER_DIOPTERS: 44.25
OS_K2POWER_DIOPTERS: 44.25
OS_AXISANGLE_DEGREES: 090
OD_AXISANGLE_DEGREES: 097
OD_K2POWER_DIOPTERS: 44.50
OD_K1POWER_DIOPTERS: 44.00

## 2025-06-26 ASSESSMENT — REFRACTION_CURRENTRX
OS_ADD: +2.25
OD_OVR_VA: 20/
OS_AXIS: 111
OS_CYLINDER: -0.50
OD_VPRISM_DIRECTION: PROGS
OS_OVR_VA: 20/
OS_VPRISM_DIRECTION: PROGS
OD_ADD: +1.75
OD_CYLINDER: -0.25
OD_SPHERE: -2.25
OD_AXIS: 033
OS_SPHERE: -2.75